# Patient Record
Sex: FEMALE | URBAN - METROPOLITAN AREA
[De-identification: names, ages, dates, MRNs, and addresses within clinical notes are randomized per-mention and may not be internally consistent; named-entity substitution may affect disease eponyms.]

---

## 2022-03-15 ENCOUNTER — EMERGENCY (EMERGENCY)
Facility: HOSPITAL | Age: 27
LOS: 1 days | Discharge: HOME | End: 2022-03-15
Attending: EMERGENCY MEDICINE | Admitting: EMERGENCY MEDICINE
Payer: COMMERCIAL

## 2022-03-15 VITALS
SYSTOLIC BLOOD PRESSURE: 107 MMHG | TEMPERATURE: 98 F | DIASTOLIC BLOOD PRESSURE: 70 MMHG | RESPIRATION RATE: 16 BRPM | OXYGEN SATURATION: 100 % | HEART RATE: 80 BPM

## 2022-03-15 VITALS
SYSTOLIC BLOOD PRESSURE: 95 MMHG | RESPIRATION RATE: 18 BRPM | DIASTOLIC BLOOD PRESSURE: 68 MMHG | TEMPERATURE: 98 F | WEIGHT: 139.99 LBS | OXYGEN SATURATION: 97 % | HEIGHT: 62 IN | HEART RATE: 85 BPM

## 2022-03-15 LAB
FLU A RESULT: DETECTED
FLUAV AG NPH QL: DETECTED
FLUBV AG NPH QL: SIGNIFICANT CHANGE UP
RSV RESULT: SIGNIFICANT CHANGE UP
RSV RNA RESP QL NAA+PROBE: SIGNIFICANT CHANGE UP
SARS-COV-2 RNA SPEC QL NAA+PROBE: SIGNIFICANT CHANGE UP

## 2022-03-15 PROCEDURE — 71046 X-RAY EXAM CHEST 2 VIEWS: CPT | Mod: 26

## 2022-03-15 PROCEDURE — 99284 EMERGENCY DEPT VISIT MOD MDM: CPT

## 2022-03-15 RX ORDER — IBUPROFEN 200 MG
1 TABLET ORAL
Qty: 20 | Refills: 0
Start: 2022-03-15 | End: 2022-03-19

## 2022-03-15 RX ORDER — PSEUDOEPHEDRINE HCL 30 MG
30 TABLET ORAL ONCE
Refills: 0 | Status: COMPLETED | OUTPATIENT
Start: 2022-03-15 | End: 2022-03-15

## 2022-03-15 RX ORDER — FAMOTIDINE 10 MG/ML
1 INJECTION INTRAVENOUS
Qty: 14 | Refills: 0
Start: 2022-03-15 | End: 2022-03-21

## 2022-03-15 RX ORDER — FAMOTIDINE 10 MG/ML
20 INJECTION INTRAVENOUS ONCE
Refills: 0 | Status: COMPLETED | OUTPATIENT
Start: 2022-03-15 | End: 2022-03-15

## 2022-03-15 RX ORDER — ONDANSETRON 8 MG/1
1 TABLET, FILM COATED ORAL
Qty: 15 | Refills: 0
Start: 2022-03-15 | End: 2022-03-19

## 2022-03-15 RX ORDER — ONDANSETRON 8 MG/1
4 TABLET, FILM COATED ORAL ONCE
Refills: 0 | Status: COMPLETED | OUTPATIENT
Start: 2022-03-15 | End: 2022-03-15

## 2022-03-15 RX ADMIN — Medication 30 MILLIGRAM(S): at 09:11

## 2022-03-15 RX ADMIN — ONDANSETRON 4 MILLIGRAM(S): 8 TABLET, FILM COATED ORAL at 09:11

## 2022-03-15 RX ADMIN — FAMOTIDINE 20 MILLIGRAM(S): 10 INJECTION INTRAVENOUS at 09:11

## 2022-03-15 NOTE — ED PROVIDER NOTE - PATIENT PORTAL LINK FT
You can access the FollowMyHealth Patient Portal offered by [facility] by registering at the following website: [email or web address]. By joining [facility] FollowMyHealth portal, you will also be able to view your health information using other applications (apps) compatible with our system.

## 2022-03-15 NOTE — ED PROVIDER NOTE - CLINICAL SUMMARY MEDICAL DECISION MAKING FREE TEXT BOX
Offered Pt oral symptomatic treatment vs IV for fluids and labs for symptomatic control. Pt preferred doing symptomatic control orally as she is feeling better already. Likely a URI, will test for COVID, get Chest X-ray and likely D/C with symptomatic medications.

## 2022-03-15 NOTE — ED PROVIDER NOTE - OBJECTIVE STATEMENT
Pt is a 25 y/o female with no med problems presents to the ED complaining of 3 days of nasal congestion, runny nose, dry cough, and sore throat. Pt has been taking some OTC meds at home, and significant other is also sick with similar symptoms. Today Pt tried to go to work and after leaving subway she felt unwell from dry cough and had 1 ep of vomiting, and then called 911. Pt has no chest pain at this time, and no abdominal pain. Pt is vaccinated vs COVID x2, no booster. Pt states no headache, no neck pain. Pt had a sore throat over the weekend which has now resolved.

## 2022-03-15 NOTE — ED ADULT TRIAGE NOTE - HEART RATE (BEATS/MIN)
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
Detail Level: Simple
Comment: Right preauric
Comment: Left proximal post upper arm, left mid medial scapula
Comment: Bx proven in 2015
Render Risk Assessment In Note?: no
Comment: Bx proven 2008
85

## 2022-03-15 NOTE — ED ADULT NURSE NOTE - OBJECTIVE STATEMENT
Pt came in c/o of sob, subjective fever, chills, congestion, decreased po intake x 3 days. Denies cp, v/d. A&Ox3 speaking in full sentences

## 2022-03-15 NOTE — ED ADULT TRIAGE NOTE - CHIEF COMPLAINT QUOTE
Pt BIBEMS complaining of sob when ambulating x 3 days. Pt states that she had a fever 3 days ago complaining of sore throat, cough and runny nose at this time.

## 2022-03-16 DIAGNOSIS — J06.9 ACUTE UPPER RESPIRATORY INFECTION, UNSPECIFIED: ICD-10-CM

## 2022-03-16 DIAGNOSIS — Z20.822 CONTACT WITH AND (SUSPECTED) EXPOSURE TO COVID-19: ICD-10-CM

## 2022-03-16 DIAGNOSIS — Z91.018 ALLERGY TO OTHER FOODS: ICD-10-CM

## 2022-03-16 DIAGNOSIS — R06.02 SHORTNESS OF BREATH: ICD-10-CM

## 2022-10-26 NOTE — ED ADULT NURSE NOTE - CAS TRG GEN SKIN COLOR
Met with Pt per her request.  Pt is teary and states she is anxious regarding being held here forever.  Discussed with Pt the importance of being stable prior to discharge and that having been drinking for 5 months return to stability and therapeutic levels of medications will not occur over night.  Recommended Pt engage in calming activities such as coloring and attend all groups on the unit.  Pt acknowledged understanding and reported feeling calmer following conversation.   Normal for race

## 2023-09-05 NOTE — ED ADULT NURSE NOTE - ISOLATION DROPLET CONTACT OTHER
Pt presents for gazyva. No new meds or concerns. Pt tolerated treatment without adverse reaction. Future appointments discussed. AVS declined. r/o covid